# Patient Record
Sex: MALE | Race: WHITE | NOT HISPANIC OR LATINO | Employment: FULL TIME | URBAN - METROPOLITAN AREA
[De-identification: names, ages, dates, MRNs, and addresses within clinical notes are randomized per-mention and may not be internally consistent; named-entity substitution may affect disease eponyms.]

---

## 2024-04-24 ENCOUNTER — TELEPHONE (OUTPATIENT)
Dept: BEHAVIORAL HEALTH | Facility: CLINIC | Age: 24
End: 2024-04-24

## 2024-04-24 ENCOUNTER — HOSPITAL ENCOUNTER (EMERGENCY)
Facility: CLINIC | Age: 24
Discharge: HOME | End: 2024-04-25
Attending: EMERGENCY MEDICINE | Admitting: EMERGENCY MEDICINE

## 2024-04-24 DIAGNOSIS — U07.1 LAB TEST POSITIVE FOR DETECTION OF COVID-19 VIRUS: ICD-10-CM

## 2024-04-24 DIAGNOSIS — R45.851 SUICIDAL IDEATION: ICD-10-CM

## 2024-04-24 PROBLEM — F43.23 ADJUSTMENT DISORDER WITH MIXED ANXIETY AND DEPRESSED MOOD: Status: ACTIVE | Noted: 2024-04-24

## 2024-04-24 LAB
ALBUMIN SERPL BCG-MCNC: 4.7 G/DL (ref 3.5–5.2)
ALP SERPL-CCNC: 71 U/L (ref 40–150)
ALT SERPL W P-5'-P-CCNC: 66 U/L (ref 0–70)
AMPHETAMINES UR QL SCN: ABNORMAL
ANION GAP SERPL CALCULATED.3IONS-SCNC: 17 MMOL/L (ref 7–15)
AST SERPL W P-5'-P-CCNC: 49 U/L (ref 0–45)
BARBITURATES UR QL SCN: ABNORMAL
BASOPHILS # BLD AUTO: 0 10E3/UL (ref 0–0.2)
BASOPHILS NFR BLD AUTO: 0 %
BENZODIAZ UR QL SCN: ABNORMAL
BILIRUB SERPL-MCNC: 1.7 MG/DL
BUN SERPL-MCNC: 10.3 MG/DL (ref 6–20)
BZE UR QL SCN: ABNORMAL
CALCIUM SERPL-MCNC: 9.6 MG/DL (ref 8.6–10)
CANNABINOIDS UR QL SCN: ABNORMAL
CHLORIDE SERPL-SCNC: 99 MMOL/L (ref 98–107)
CREAT SERPL-MCNC: 0.75 MG/DL (ref 0.67–1.17)
DEPRECATED HCO3 PLAS-SCNC: 20 MMOL/L (ref 22–29)
EGFRCR SERPLBLD CKD-EPI 2021: >90 ML/MIN/1.73M2
EOSINOPHIL # BLD AUTO: 0 10E3/UL (ref 0–0.7)
EOSINOPHIL NFR BLD AUTO: 0 %
ERYTHROCYTE [DISTWIDTH] IN BLOOD BY AUTOMATED COUNT: 12.3 % (ref 10–15)
FENTANYL UR QL: ABNORMAL
GLUCOSE BLDC GLUCOMTR-MCNC: 93 MG/DL (ref 70–99)
GLUCOSE SERPL-MCNC: 67 MG/DL (ref 70–99)
HCT VFR BLD AUTO: 44.8 % (ref 40–53)
HGB BLD-MCNC: 15.9 G/DL (ref 13.3–17.7)
IMM GRANULOCYTES # BLD: 0 10E3/UL
IMM GRANULOCYTES NFR BLD: 0 %
LYMPHOCYTES # BLD AUTO: 1.1 10E3/UL (ref 0.8–5.3)
LYMPHOCYTES NFR BLD AUTO: 11 %
MCH RBC QN AUTO: 31.9 PG (ref 26.5–33)
MCHC RBC AUTO-ENTMCNC: 35.5 G/DL (ref 31.5–36.5)
MCV RBC AUTO: 90 FL (ref 78–100)
MONOCYTES # BLD AUTO: 0.4 10E3/UL (ref 0–1.3)
MONOCYTES NFR BLD AUTO: 4 %
NEUTROPHILS # BLD AUTO: 8.6 10E3/UL (ref 1.6–8.3)
NEUTROPHILS NFR BLD AUTO: 85 %
NRBC # BLD AUTO: 0 10E3/UL
NRBC BLD AUTO-RTO: 0 /100
OPIATES UR QL SCN: ABNORMAL
PCP QUAL URINE (ROCHE): ABNORMAL
PLATELET # BLD AUTO: 214 10E3/UL (ref 150–450)
POTASSIUM SERPL-SCNC: 4.3 MMOL/L (ref 3.4–5.3)
PROT SERPL-MCNC: 7.5 G/DL (ref 6.4–8.3)
RBC # BLD AUTO: 4.98 10E6/UL (ref 4.4–5.9)
SARS-COV-2 RNA RESP QL NAA+PROBE: POSITIVE
SODIUM SERPL-SCNC: 136 MMOL/L (ref 135–145)
WBC # BLD AUTO: 10.1 10E3/UL (ref 4–11)

## 2024-04-24 PROCEDURE — 82374 ASSAY BLOOD CARBON DIOXIDE: CPT | Performed by: EMERGENCY MEDICINE

## 2024-04-24 PROCEDURE — 82247 BILIRUBIN TOTAL: CPT | Performed by: EMERGENCY MEDICINE

## 2024-04-24 PROCEDURE — 99285 EMERGENCY DEPT VISIT HI MDM: CPT

## 2024-04-24 PROCEDURE — 250N000013 HC RX MED GY IP 250 OP 250 PS 637: Performed by: EMERGENCY MEDICINE

## 2024-04-24 PROCEDURE — 80307 DRUG TEST PRSMV CHEM ANLYZR: CPT | Performed by: EMERGENCY MEDICINE

## 2024-04-24 PROCEDURE — 250N000009 HC RX 250: Performed by: EMERGENCY MEDICINE

## 2024-04-24 PROCEDURE — 51798 US URINE CAPACITY MEASURE: CPT

## 2024-04-24 PROCEDURE — 87635 SARS-COV-2 COVID-19 AMP PRB: CPT | Performed by: EMERGENCY MEDICINE

## 2024-04-24 PROCEDURE — 36415 COLL VENOUS BLD VENIPUNCTURE: CPT | Performed by: EMERGENCY MEDICINE

## 2024-04-24 PROCEDURE — 85025 COMPLETE CBC W/AUTO DIFF WBC: CPT | Performed by: EMERGENCY MEDICINE

## 2024-04-24 PROCEDURE — 82962 GLUCOSE BLOOD TEST: CPT

## 2024-04-24 RX ORDER — LIDOCAINE HYDROCHLORIDE 20 MG/ML
6 JELLY TOPICAL ONCE
Status: COMPLETED | OUTPATIENT
Start: 2024-04-24 | End: 2024-04-24

## 2024-04-24 RX ORDER — OLANZAPINE 5 MG/1
10 TABLET, ORALLY DISINTEGRATING ORAL 2 TIMES DAILY PRN
Status: DISCONTINUED | OUTPATIENT
Start: 2024-04-24 | End: 2024-04-25 | Stop reason: HOSPADM

## 2024-04-24 RX ORDER — LORAZEPAM 1 MG/1
1 TABLET ORAL ONCE
Status: COMPLETED | OUTPATIENT
Start: 2024-04-24 | End: 2024-04-24

## 2024-04-24 RX ORDER — OXYMETAZOLINE HCL 0.05 %
2 SPRAY, NON-AEROSOL (ML) NASAL AT BEDTIME
COMMUNITY

## 2024-04-24 RX ADMIN — LIDOCAINE HYDROCHLORIDE 6 ML: 20 JELLY TOPICAL at 13:40

## 2024-04-24 RX ADMIN — NICOTINE POLACRILEX 2 MG: 2 GUM, CHEWING ORAL at 05:05

## 2024-04-24 RX ADMIN — LORAZEPAM 1 MG: 1 TABLET ORAL at 05:08

## 2024-04-24 RX ADMIN — OLANZAPINE 10 MG: 5 TABLET, ORALLY DISINTEGRATING ORAL at 21:54

## 2024-04-24 RX ADMIN — NICOTINE POLACRILEX 2 MG: 2 GUM, CHEWING ORAL at 14:41

## 2024-04-24 RX ADMIN — NICOTINE POLACRILEX 2 MG: 2 GUM, CHEWING ORAL at 21:17

## 2024-04-24 RX ADMIN — NICOTINE POLACRILEX 2 MG: 2 GUM, CHEWING ORAL at 09:58

## 2024-04-24 ASSESSMENT — ACTIVITIES OF DAILY LIVING (ADL)
ADLS_ACUITY_SCORE: 35

## 2024-04-24 NOTE — ED TRIAGE NOTES
BIBA for SI. Patient went through a break up this evening. Patients mother called PD. Patient was found in [place] and was very distraught when talking to PD threatening to shoot himself or drive off a bridge. Patient was found with vehicle and without a weapon.Patient brought in on a transport hold. VSS.

## 2024-04-24 NOTE — PHARMACY-ADMISSION MEDICATION HISTORY
Pharmacist Admission Medication History    Admission medication history is complete. The information provided in this note is only as accurate as the sources available at the time of the update.    Information Source(s): Patient and CareEverywhere/SureScripts via in-person    Pertinent Information: none    Changes made to PTA medication list:  Added: Zzzquil  Deleted: None  Changed: None    Medication History Completed By:   Yari Garcia PharmD, [facility]   Emergency Medicine Pharmacist  [phone] or Elliot  April 24, 2024    PTA Med List   Medication Sig Last Dose    diphenhydrAMINE HCl, Sleep, (ZZZQUIL) 25 MG CAPS Take 2 capsules by mouth at bedtime 4/23/2024 at hs

## 2024-04-24 NOTE — TELEPHONE ENCOUNTER
2:14 PM: Intake called Infection Prevention to have Pt reviewed after Pt tested positive Covid. Voicemail was left.    3:22 PM: Infection Prevention reviewed Pt's chart. Pt renata be flagged for Covid and will need a private room. Pt will be formed of Covid guidelines upon placement.

## 2024-04-24 NOTE — TELEPHONE ENCOUNTER
R: [place]  Access Inpatient Bed Call Log 4/24/24 7:26 AM    Intake has called facilities that have not updated the bed status within the last 12 hours.                   Adults:         [facility] is posting 0 beds.         [facility] is posting 0 beds. [phone]   [facility] posted 0 beds. [facility] [phone] Negative covid required.     [facility] is posting 0 beds. Neg covid. No high school/[facility]-psych. [phone]; Per call at 7:28am to [facility] currently at capacity; unsure of any discharges can check back later.   [facility] posted 0 beds.  [phone] [facility] posted 0 beds. [phone]    [facility] posted 0 beds.  [facility] [phone]   [facility] is posting 0 YA beds. Ages 18-28. Negative covid. [phone]; Per call at 7:49am beds are available for YA, child and adolescent reviews.   [facility] is posting 1 bed. [phone]        Pt remains on the work list pending appropriate bed availability.

## 2024-04-24 NOTE — TELEPHONE ENCOUNTER
S: [facility] [place] ED , DEC  Per  calling at 10:07 AM  about a 23 year old/Male presenting with SI w/ a plan.     B: Pt arrived via Police. Presenting problem, stressors: Police. Mom called w/ concerns of comments made about suicide. Pt has plan to drive into a body of water or to shoot himself in the head. Yesterday his girlfriend broke up with him.    Pt affect in ED: Flat and Irritable   Pt Dx: Major Depressive Disorder, Generalized Anxiety Disorder, and Adjustment Disorder  Previous IPMH hx? No  Pt endorses SI with a plan to shoot himself or drive into a body of water.    Hx of suicide attempt? Yes: 2022 by overdose, no med intervention and was not reported at the time.  Pt endorses SIB via head bang, most recent episode unknown.  Pt denies HI   Pt denies hallucinations .   Pt RARS Score: 2    Hx of aggression/violence, sexual offenses, legal concerns, Epic care plan? describe: No  Current concerns for aggression this visit? No  Does pt have a history of Civil Commitment? No  Is Pt their own guardian? Yes    Pt is not prescribed medication. Is patient medication compliant? N/A  Pt denies OP services   CD concerns: Actively using/consuming marijuana daily and drinks beer sometimes to fall asleep  Acute or chronic medical concerns: No  Does Pt present with specific needs, assistive devices, or exclusionary criteria? None      Pt is ambulatory  Pt is able to perform ADLs independently      A: Pt to be reviewed for [facility] admission. Pt is Voluntary  Preferred placement: [facility]1    COVID Symptoms: Tested positive for Covid 4/24/24.  If yes, COVID test required   Utox: Not ordered, intake to request lab    CMP: Not ordered, intake requested lab  CBC: Not ordered, intake requested lab      R: Patient cleared and ready for behavioral bed placement: Yes  Pt placed on [facility] worklist? Yes    Does Patient need a Transfer Center request created? Yes, writer completed Transfer Center request at: 10:13 AM.

## 2024-04-24 NOTE — ED PROVIDER NOTES
"  History     Chief Complaint:  Suicidal       The history is provided by the patient.      Ifeanyi Cooley is a 23 year old male with history of anxiety who presents to the ED via EMS alone for evaluation of suicidal ideation. Patient reports \"there is a lot going on.\" He has been dealing with chronic medical problems and trying to pay rent. Family is not a reliable support system. He has had ongoing issues with his girlfriend who broke up with him this evening. States she was the only one he could turn to. Ifeanyi was very hurt by the lack of support from his family tonight. His mother reportedly caused anxiety and frustration and then called the police. He made suicidal comments upon Police arrival. 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 Denies thoughts of self harm. He doesn't want to feel the pain and frustration anymore. Reports notable family history of mental illness. He notes calling the suicide hotline. He also called his friends who have been supportive in the past. Dg with nicotine.      Independent Historian:   None - Patient Only      Medications:    Ativan    Past Medical History:    Anxiety       Physical Exam   Patient Vitals for the past 24 hrs:   BP Pulse Resp SpO2   04/24/24 0438 106/81 89 18 100 %        Physical Exam  General: Patient is alert, awake and interactive when I enter the room  Head: The scalp, face, and head appear normal  Eyes: Conjunctivae are normal  ENT: The nose is normal, Pinnae are normal, External acoustic canals are normal  Neck: Trachea midline  CV: Pulses are normal.   Resp: No respiratory distress   Musc: Normal muscular tone, moving all extremities.  Skin: No rash or lesions noted  Neuro:  Speech is normal and fluent. Face is symmetric.   Psych: Tearful.  Poor eye contact.  Denies any active suicidal or homicidal ideation.  Denies any auditory or visual hallucinations.    Emergency Department Course     Emergency Department Course & " Assessments:    Interventions:  Medications   nicotine (NICORETTE) gum 2 mg (2 mg Buccal $Given 4/24/24 0505)   LORazepam (ATIVAN) tablet 1 mg (1 mg Oral $Given 4/24/24 0508)        Assessments:  0437 I obtained patient history and performed a physical exam.     Consultations/Discussion of Management or Tests:  ED Course as of 04/24/24 0718   Wed Apr 24, 2024   0437 I obtained patient history and performed a physical exam.        Social Determinants of Health affecting care:   None and Financial Insecurity    Disposition:  Signed out pending mental health assessment    Impression & Plan         Medical Decision Making:  Patient is a 23-year-old gentleman who presents to the emergency department after making some suicidal statements to his mother.  Patient's been under a significant amount of stress both financial and emotional.  After recent break-up with his girlfriend and the lack of family support he felt like he had nowhere to turn and made some suicidal comments.  In the emergency department he actively denies any intent to carry out these actions.  He denies any actions of self-harm this evening.  Patient was placed on a hold and is currently awaiting mental health assessment.  Final disposition will be based on this assessment.      Diagnosis:    ICD-10-CM    1. Suicidal ideation  R45.851              Scribe Disclosure:  I, Sharron Francois, am serving as a scribe at 4:51 AM on 4/24/2024 to document services personally performed by Thony Contreras MD based on my observations and the provider's statements to me.   4/24/2024   Thony Contreras MD Battista, Christopher Joseph, MD  04/24/24 9495

## 2024-04-24 NOTE — CONSULTS
"Triage & Transition Services, Extended Care       Patient: Ifeanyi goes by \"Ifeanyi,\" uses he/him pronouns  Date of Service: April 24, 2024  Site of Service: [facility] EMERGENCY DEPT                             ED15  Patient was seen   Virtual: [facility]  Mode of Assessment:      Patient is followed related to: other  Notable observations from today's encounter include: NA  The care team is working towards the following:    Significant status changes: no  Case Management included: Case Management Included: collaborating with patient's support system  Details on Collaborating with Patient's Support System: Notified Pt's care team that the MEGAN consult has been completed.  Summary of Interaction: Writer met with pt to complete MEGAN consult. Pt denied his use was problematic and reported he would have no problem quitting on his own. Pt reported he is not feeling very down and axious. He reported needing support and was upset that his family is unsupportive. Pt denied any SI.    Recommendations: If you are struggling with alcohol or substance abuse, please reach out to one of the following treatment centers to get an outpatient MEGAN assessment.     [facility] - Walk in Assessments -8am-4pm M-F  [address] [place] [address]  [phone]    [facility] Walk in Assessments -8am-4pm M-F  [address] [id number] [phone]    [facility] - Assessments by appointment.  [address] [place] [address]  [phone]    Sober Support Groups:     [place] [facility] ([facility]): [facility] connects people seeking recovery to resources that help foster and sustain long-term recovery. Whether you are seeking resources for treatment, transportation, housing, job training, education, health care or other pathways to recovery, [facility] is a great place to start. Phone: 527.136.1938. www.[place]Envysion.iloho (Great listing of all types of recovery and " non-recovery related resources)     SMART Recovery: https://www.smartrecovery.org/ (Online meetings, support groups, resources)     Alcoholics Anonymous: 1-800-ALCOHOL  HTTP://WWW.AA.ORG/  [facility] [place] (729-056-8609 or http://a[place].org)  [facility] [place] (860-221-9887 or www.aastpaul.org)     Narcotics Anonymous: 850.208.6444  www.DDStocks..     The [facility]:  Offers a wide range of different sober support groups and a Sunday Service.  [address] [place] [address]    https://www.[facility].org/#gsc.tab=0        Summary: Writer met with pt to complete MEGAN consult. Pt denied his use was problematic and reported he would have no problem quitting on his own. Pt reported he is not feeling very down and axious. He reported needing support and was upset that his family is unsupportive. Pt denied any SI.        Legal Status:    Legal Status at Admission: Voluntary/Patient has signed consent for treatment    [facility]   Extended Care  [phone]

## 2024-04-24 NOTE — ED NOTES
Pt reports need catheter, and is uncomfortable.  Bedside attendant in room.  ED tech aware need straight cath that provider ordered.

## 2024-04-24 NOTE — ED NOTES
Pt asked writer to put catheter in him d/t problems urinating. Writer informed pt that we have certain protocols to follow before placing a catheter. Bladder scan performed on pt. Results show 350 mL. Antoine RN informed.

## 2024-04-24 NOTE — PLAN OF CARE
Ifeanyi Cooley  April 24, 2024  Plan of Care Hand-off Note     Patient Care Path: inpatient mental health    Plan for Care:   Lexa presents to the ED after 911 was called by pt mother reporting concerns for suicidal comments patient made. Patient was found sitting in his car. Patient reported to PD methods of suicide to shoot himself or to drive into water. Patient now reports he is homeless and has no support. Patient does not endorse any plans or intent for suicide to writer, however given his recent interrupted suicide attempt early this morning and stressful psychosocial circumstances, patient is recommended to admit to [facility] unit for safety and stabilization. Patient would be an acute risk of harm to himself should he discharge at this time.    Identified Goals and Safety Issues:  Suicidal ideation, suicide precautions in place recommended. Patient could benefit from MEGAN assessment - writer will place order.     Overview:     Patient does not want care team to provide updates to his momJanie.           Legal Status: Legal Status at Admission: Voluntary/Patient has signed consent for treatment    Psychiatry Consult: No       Updated  MD, RN, Pt regarding plan of care.           Per Turner
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
## 2024-04-24 NOTE — ED PROVIDER NOTES
Patient signed out to me by Dr. Contreras.  Please see initial provider note for full details. In brief, patient presents with SI.    9:30 AM - I spoke with DEC (Per) regarding the patient.  Per DEC, collateral SI statements over the last few days with plan/methods.  Recommends IP mental health admission.      DEC requests labs, urine tox, and COVID testing for IP psych admission.     Labs notable for mild hyperglycemia and mild anion gap acidosis.  Suspect from poor oral intake - no ASA use or ETOH use.  No signs of infection.  Regular diet ordered.  Patient is positive for COVID-19 on testing but denies any symptoms of COVID-19. He is vitally stable. Lungs are clear, no concern for pneumonia.  He is medically cleared for psych admission.     Russell Diego MD  Emergency Medicine     Brandie, Russell Strickland MD  04/24/24 1341       Russell Diego MD  04/24/24 1341

## 2024-04-24 NOTE — ED NOTES
Patient searched by security and changed into gown. 1 bag of belongings placed into the DEC office. Patient also verbally denied sharing information with mother.

## 2024-04-25 ENCOUNTER — TELEPHONE (OUTPATIENT)
Dept: BEHAVIORAL HEALTH | Facility: CLINIC | Age: 24
End: 2024-04-25

## 2024-04-25 VITALS
OXYGEN SATURATION: 99 % | RESPIRATION RATE: 18 BRPM | TEMPERATURE: 97.6 F | DIASTOLIC BLOOD PRESSURE: 79 MMHG | HEART RATE: 80 BPM | SYSTOLIC BLOOD PRESSURE: 112 MMHG

## 2024-04-25 PROCEDURE — 250N000013 HC RX MED GY IP 250 OP 250 PS 637: Performed by: EMERGENCY MEDICINE

## 2024-04-25 PROCEDURE — 250N000011 HC RX IP 250 OP 636: Performed by: EMERGENCY MEDICINE

## 2024-04-25 RX ORDER — ONDANSETRON 4 MG/1
4 TABLET, ORALLY DISINTEGRATING ORAL ONCE
Status: COMPLETED | OUTPATIENT
Start: 2024-04-25 | End: 2024-04-25

## 2024-04-25 RX ADMIN — NICOTINE POLACRILEX 2 MG: 2 GUM, CHEWING ORAL at 13:29

## 2024-04-25 RX ADMIN — ONDANSETRON 4 MG: 4 TABLET, ORALLY DISINTEGRATING ORAL at 06:59

## 2024-04-25 ASSESSMENT — COLUMBIA-SUICIDE SEVERITY RATING SCALE - C-SSRS
1. SINCE LAST CONTACT, HAVE YOU WISHED YOU WERE DEAD OR WISHED YOU COULD GO TO SLEEP AND NOT WAKE UP?: NO
ATTEMPT SINCE LAST CONTACT: NO
2. HAVE YOU ACTUALLY HAD ANY THOUGHTS OF KILLING YOURSELF?: NO
LETHALITY/MEDICAL DAMAGE CODE MOST LETHAL ACTUAL ATTEMPT: MINOR PHYSICAL DAMAGE
MOST LETHAL DATE: 66110
TOTAL  NUMBER OF ABORTED OR SELF INTERRUPTED ATTEMPTS SINCE LAST CONTACT: NO
TOTAL  NUMBER OF INTERRUPTED ATTEMPTS SINCE LAST CONTACT: NO
6. HAVE YOU EVER DONE ANYTHING, STARTED TO DO ANYTHING, OR PREPARED TO DO ANYTHING TO END YOUR LIFE?: NO

## 2024-04-25 ASSESSMENT — ACTIVITIES OF DAILY LIVING (ADL)
ADLS_ACUITY_SCORE: 35

## 2024-04-25 NOTE — ED NOTES
"Writer had long conversation with patient. Pt is feeling anxious and scared about where he is going to be placed. He endorses SI but says \"I don't have the courage to do it\" and \"I'm not gonna kill myself\". Pt notes he was looking into [facility] units prior to PD arrival and he would like to go someplace like that. Pt told writer that he just feels like he needs a support group. Endorses feeling stressed about missing work, money, and lack of support in his life.  "

## 2024-04-25 NOTE — ED NOTES
Pt straight catheterized, 200ml urine returned, PVR also 200ml. Pt tearful following bladder scan, explaining that he's scared and he doesn't want to have to be catheterized every day. Pt told writer that he urinates fine at home, he is anxious here and wants more privacy. Pt also requesting a shower; RN told pt he will likely be able to get one in the morning.

## 2024-04-25 NOTE — ED NOTES
"Patient woke up from a nap extremely anxious and wanting to know the plan. Attempted to answer patients  questions and medication for anxiety but patient unhappy that he is waiting for inpatient placement and stated \"they didn't listen to anything he said to DEC\" Patient then refused medications. States he doesn't know how he can stay here if he can't pee. MD updated.   "

## 2024-04-25 NOTE — TELEPHONE ENCOUNTER
R: [place]  Access Inpatient Bed Call Log 4/24/24 @9:40 PM:    Intake has called facilities that have not updated the bed status within the last 12 hours.                   Adults:         [facility] is posting 0 beds.         [facility] is posting 0 beds. [phone]   [facility] posted 0 beds. [facility] [phone] Negative covid required.     [facility] is posting 0 beds. Neg covid. No high school/[facility]-psych. [phone]; Per call at 7:28am to [facility] currently at capacity; unsure of any discharges can check back later.   [facility] posted 0 beds.  [phone] [facility] posted 0 beds. [phone]    [facility] posted 0 beds.  [facility] [phone]   [facility] is posting 2 YA beds. Ages 18-28. Negative covid. [phone]; Per call at 7:49am beds are available for YA, child and adolescent reviews.   [facility] is posting 0 bed. [phone]        [facility] is posting 3 beds. LOW acuity ONLY. Mixed unit 12+. Negative covid- [phone].  Per call, not appropriate.    [facility] has 3 beds posted. No aggression. Negative Covid. Low acuity.  At capacity       [facility] is posting 0 beds. Negative covid. [phone]   [facility] is posting 0 beds. Low acuity. No current aggression.           Pt remains on the work list pending appropriate bed availability.

## 2024-04-25 NOTE — ED NOTES
Pt attempted to urinate without success. Bladder scanned pt. 474 mls in bladder. Pt agreed to catheterization after meds.

## 2024-04-25 NOTE — ED NOTES
Spoke with patient's sister Jodi. Patient prefers any care and information about him be relayed to Jodi.

## 2024-04-25 NOTE — ED NOTES
Pt awake, asked pt if he could try to pee again & educated on the importance of trying to empty his bladder. Pt stated that he would try in a little bit but did not want to do that now.

## 2024-04-25 NOTE — ED NOTES
RN ED Mental Health Handoff Note    Voluntary    Does patient require 1:1? Yes    Hold and rights been given and documented for patient: Yes    Is the patient in BH scrubs? No -Gown    Has the patient been searched? Yes    Is the 15 minute observation tool up to date? Yes    Was patient issued a welcome folder? Yes    Room check completed this shift: Yes    PSS3 and [place] Assessment/Reassessment this shift:    C-SSRS ([place])      Date and Time Q1 Wished to be Dead (Past Month) Q2 Suicidal Thoughts (Past Month) Q3 Suicidal Thought Method Q4 Suicidal Intent without Specific Plan Q5 Suicide Intent with Specific Plan Q6 Suicide Behavior (Lifetime) Within the Past 3 Months? Level of Risk per Screen Level of Risk per Screen User   04/24/24 0441 1-->yes 1-->yes 1-->yes -- 1-->yes 1-->yes 1-->yes -- high risk SA            Behavioral status of patient: Green    Code 21 called this shift? No    Use of restraints/seclusion this shift? No    Most recent vital signs:  Temp: 98.3  F (36.8  C) Temp src: Temporal BP: 106/73 Pulse: 84   Resp: 16 SpO2: 100 %        Medications:  Scheduled medication compliance? Yes    PRN Meds administered this shift? No    Medications   nicotine (NICORETTE) gum 2 mg (2 mg Buccal $Given 4/24/24 2117)   OLANZapine zydis (zyPREXA) ODT tab 10 mg (10 mg Oral $Given 4/24/24 2154)   LORazepam (ATIVAN) tablet 1 mg (1 mg Oral $Given 4/24/24 0508)   lidocaine (XYLOCAINE) 2 % external gel 6 mL (6 mLs Urethral $Given 4/24/24 1340)         ADLs    Meal Provided this shift? No    Hygiene items provided? Yes    ADLs completed? No    Date of last shower: PTA- wants to shower in AM    Any significant events this shift? No    Any information that would be helpful in caring for this patient?  Pt has trouble w/ urination here, no problems prior. Straight cath x2, able to void x1 w/ small amount of residual. Pt anxious about plan.     Family present/updated? No    Location of patient's belongings: DEC office.      Critical Care Minutes:  Does the patient need critical care minutes documented? No                \

## 2024-04-25 NOTE — PROGRESS NOTES
"Triage and Transition Services Extended Care Reassessment     Patient: Ifeanyi goes by \"Lexa,\" uses he/him pronouns  Date of Service: April 25, 2024  Site of Service: [facility] EMERGENCY DEPT                             ED05  Patient was seen yes  Mode of Assessment: Virtual: AmWell     Reason for Reassessment: worsening psychosocial stress, depression, suicidal ideation    History of Patient's Original Emergency Room Encounter: Ifeanyi \"Lexa\" comes to the ED by police after his mom called police for concerns that patient was a risk of harm to himself. Police found the patient sitting in his car and patient reported plans for suicide to police. Patient had plans to drive off a bridge or shoot himself, per notes. Patient does not detail plans for suicide to writer, and altogether does not endorse having plans for suicide, or intent to end his life. Patient reports having a \"rough patch\" with his girlfriend in recent weeks and that she broke up with him yesterday,pt states he suspected it was coming. Now, patient states he is homeless and has no one for support in his life. He felt betrayed by his mom for calling police yesterday, and states \"I just wanted to feel supported by my parents, I have never felt like I can depend on them\".    Presentation Summary: Patient is alert and oriented x4. Patient was calm and cooperative. Patient engaged fully in the assessment process. Patient is able to concisely articulate precipitating events to this emergency department encounter. Patient identifies stressors as his girlfriend breaking up with him earlier this week, as well as conflict with his mother. Patient reports he was \"so overwhelmed\" prior to arrival that he \"said a whole bunch of things\". Patient admits he \"scared people close to me\" in order to get \"validation\". Patient reports he was feeling alone and shut out, and wanted to feel heard. Patient reports wanting help to feel better. Patient identifies " "support system with his sister, grandma, and boss. Patient identifies friends Rodolfo, Kj, and Irene. Patient denies any current suicidal ideation, plan, or intent. Patient was asked by this writer about initial reports of plan to use firearm. Patient denies making any such comments and similarly denies access to any firearms. Patient denies having a firearm permit or money to purchase a firearm. Patient reports he \"could never do that\". Patient reports he would not want to leave loved ones behind. Patient denies any SIB, HI, AH or VH. Patient endorses substance use including nicotine delta 8 but denies any concern with this. Patient is future oriented and reports being in the process for filing for state insurance as well as trying to get one of his teeth pulled soon. Patient reports wanting to discharge from the hospital, cites being uncomfortable here due to having to be catheterized in order to relieve his bladder. Patient reports urine retention happens when he is under increased anxiety and settings he is unfamiliar with. Patient is help seeking and future oriented. Patient reports he will discharge and stay with his sister.    Changes Observed Since Initial Assessment: decrease in presenting symptoms    Therapeutic Interventions Provided: Engaged in safety planning, Worked on relapse prevention planning (review of stressors, early warning signs, written plan to respond to signs, and rehearse plan).    Current Symptoms: anxious sadness anxious        Mental Status Exam   Affect: Appropriate  Appearance: Appropriate  Attention Span/Concentration: Attentive  Eye Contact: Engaged    Fund of Knowledge: Appropriate   Language /Speech Content: Fluent  Language /Speech Volume: Normal  Language /Speech Rate/Productions: Normal  Recent Memory: Intact  Remote Memory: Intact  Mood: Depressed, Anxious  Orientation to Person: Yes   Orientation to Place: Yes  Orientation to Time of Day: Yes  Orientation to Date: Yes   " "  Situation (Do they understand why they are here?): Yes  Psychomotor Behavior: Normal  Thought Content: Clear  Thought Form: Intact    Treatment Objective(s) Addressed: assessing safety, safety planning, identifying an appropriate aftercare plan    Patient Response to Interventions: eager to participate, verbalizes understanding    Progress Towards Goals:  Patient Reports Symptoms Are: improving  Patient Progress Toward Goals: is making progress  Next Step to Work Toward Discharge: collaboration with OP team/family/friends  Collaboration Comment:  spoke with patient's sister Jodi, with patient's permission.  Please see details in case management section below.    Case Management: Case Management Included: collaborating with patient's support system  Details on Collaborating with Patient's Support System: Sister Jodi Adair [phone]  Summary of Interaction: Patient's sister reports their mother has TBI, borderline personality disorder and alcohol abuse. Patient's mother lost custody of patient and his siblings at age 4. Patient's father also used substances, including methamphetamine. Patient was in out of home placement during his childhood. Patient's sister reports mother's ongoing mental decompensation is impacting patient currently. Patient's mother is currently \"on a granado\". Patient subsequently left home yesterday. Patient called his sister to express feeling like he is struggling and lacks support. Patient's mother is \"quick to contact police to be vindictive\" and \"calls the police on him all the time\". Patient's sister does not think being in the hospital alone with COVID is \"not helpful for his mental state\". Patient's sister denies concern for patient suicidality. Patient's sister reports patient is calm and was just \"crying that he needed support from their mom\".    C-SSRS Since Last Contact:   1. Wish to be Dead (Since Last Contact): No  2. Non-Specific Active Suicidal Thoughts " "(Since Last Contact): No     Actual Attempt (Since Last Contact): No  Has subject engaged in non-suicidal self-injurious behavior? (Since Last Contact): No  Interrupted Attempts (Since Last Contact): No  Aborted or Self-Interrupted Attempt (Since Last Contact): No  Preparatory Acts or Behavior (Since Last Contact): No  Most Lethal Attempt Date: 01/01/22  Actual Lethality/Medical Damage Code (Most Lethal Attempt): Minor physical damage  Calculated C-SSRS Risk Score (Since Last Contact): No Risk Indicated    Plan: Final Disposition / Recommended Care Path: discharge  Plan for Care reviewed with assigned Medical Provider: yes  Plan for Care Team Review: provider  Comments: Dr. Huffman  Patient and/or validated legal guardian concurs: yes  Clinical Substantiation: Patient is alert and oriented x4. Patient was calm and cooperative. Patient engaged fully in the assessment process. Patient is able to concisely articulate precipitating events to this emergency department encounter. Patient identifies stressors as his girlfriend breaking up with him earlier this week, as well as conflict with his mother. Patient reports he was \"so overwhelmed\" prior to arrival that he \"said a whole bunch of things\". Patient has denied any suicidal ideation since his arrival.  Patient continues to deny any concern for SIB, SI, HI, AH or VH.  Patient is requesting discharge.   spoke with sister who is in agreement of patient's return back.  Patient is future oriented with plans to apply for government insurance.  Patient will have [facility] follow-up for outpatient services to begin once his insurance is approved.    Legal Status: Legal Status at Admission: Voluntary/Patient has signed consent for treatment    Session Status: Time session started: 1500  Time session ended: 1530  Session Duration (minutes): 30 minutes  Session Number: 1  Anticipated number of sessions or this episode of care: 1    Session Start Time: 1500  Session Stop " Time: 1530  CPT codes: 67637 - Psychotherapy (with patient) - 30 (16-37*) min  Time Spent: 30 minutes      CPT code(s) utilized: 56356 - Psychotherapy (with patient) - 30 (16-37*) min    Diagnosis:   Patient Active Problem List   Diagnosis    Adjustment disorder with mixed anxiety and depressed mood    Alcohol use disorder, mild, in early remission    Cannabis use disorder, moderate, in early remission (H)    ROSHAN (generalized anxiety disorder)    Moderate single current episode of major depressive disorder (H)    Oppositional defiant disorder    Parent/child conflict    S/P appendectomy       Primary Problem This Admission: Active Hospital Problems    Adjustment disorder with mixed anxiety and depressed mood        DANIEL Perkins   Licensed Mental Health Professional (LMHP), [facility]  [phone]

## 2024-04-25 NOTE — ED PROVIDER NOTES
Took over care of the patient at handoff.    Patient has been calm and cooperative during my shift.  No acute events.  I do get a call from DEC was reevaluated the patient.  He is denying any suicidal or homicidal ideation.  DEC has also spoken with the patient's sister.  Patient states that he has other places to stay, he does not want to go back with his mother at this point.  Since his mother is a trigger for him I think this is a good idea.  Patient will either stay with a friend or with his sister.  At this point I do not believe that the patient is a harm to themselves or others.     Patient will discharge home.     Kierra Huffman MD  04/27/24 0716

## 2024-04-25 NOTE — TELEPHONE ENCOUNTER
R:  No bed availability within [facility]    Pt did test positive for COVID yesterday  4/24/24.  Pt agreeable to [facility] +1hr bed search.    Bed search initiated @ 10:40am       [facility] is posting 0 beds. [phone] Per call at 7:23am to [facility] currently no beds available.   [facility] posted 0 beds. [facility] [phone] Negative covid required.     [facility] is posting 0 beds. Neg covid. No high school/[facility]-psych. [phone]; Per call at 7:24am to [facility] currently at capacity.   [place] posted 0 beds.  [phone] [facility] posted 0 beds. [phone]    [facility] posted 0 beds.  [facility] [phone]   [facility] is posting 1 YA beds. Ages 18-28. Negative covid. [phone]; Per call at 7:25am to [facility], child, adolescent beds are available, however, no jovanny beds.   [facility] ([facility]) is posting 0 bed. [phone]        [facility] is posting 3 beds. LOW acuity ONLY. Mixed unit 12+. Negative covid- [phone]  Will not review for 6 days after a Positvie COVID Test.   [facility] has 0 beds posted. No aggression. Negative Covid. Low acuity       [facility] is posting 0 beds. Negative covid. [phone] Per call at 7:26am to [facility] at capacity for IP units.   [facility] is posting 1 beds. Low acuity only. Neg covid.  [phone]  [facility] will not review until 9 days clear of COVID.    [facility] ([place] posted 0 beds. Negative covid.  [phone].    [facility] ([place] is posting 0 beds    Pt remains on the work list pending appropriate bed availability.

## 2024-04-25 NOTE — TELEPHONE ENCOUNTER
R: [place]  Access Inpatient Bed Call Log  4/25/2024 12:03 AM  Intake has called facilities that have not updated their bed status within the last 12 hours.??      ADULTS:     *[place]RO  [place] -- [facility]: @ cap per website.  [place] -- [facility]:  @ cap per website.   [place] -- [facility]: @ Cap per website.  [place] -- [facility]: @ cap per website. - 12:34 AM Per [place], they are capped. Call in the morning.   [place] -- [facility]: @ Cap per website.  [facility] -- [facility]: @ cap per website.   [place] -- [facility]/YA beds @Posting 2 beds. Ages 18-28, Voluntary only, COVID test req'd, NO aggression, physical or sexual assault, violence hx or drug abuse, or psychosis - Needs negative covid  [place] -- [facility]: @ Cap per website.  [place] -- [facility]: @ cap per website.  [place] -- [facility]:  @ Cap per website.      Pt remains on waitlist pending appropriate placement availability.

## 2024-04-25 NOTE — ED NOTES
"Patient refusing intermittent straight caths. States \"I'm not doing that. I'll wait till my bladder explodes.\"  "

## 2024-04-25 NOTE — ED NOTES
Patient states information may be given to grandmother Haven but not to his mother at this time. Info in demographics.

## 2024-04-25 NOTE — ED NOTES
RN ED Mental Health Handoff Note    Voluntary - holdable    Does patient require 1:1? Yes    Hold and rights been given and documented for patient: Yes    Is the patient in BH scrubs? No -gown    Has the patient been searched? Yes    Is the 15 minute observation tool up to date? Yes    Was patient issued a welcome folder? Yes    Room check completed this shift: Yes    PSS3 and [place] Assessment/Reassessment this shift:    C-SSRS ([place])      Date and Time Q1 Wished to be Dead (Past Month) Q2 Suicidal Thoughts (Past Month) Q3 Suicidal Thought Method Q4 Suicidal Intent without Specific Plan Q5 Suicide Intent with Specific Plan Q6 Suicide Behavior (Lifetime) Within the Past 3 Months? Level of Risk per Screen Level of Risk per Screen User   04/24/24 0441 1-->yes 1-->yes 1-->yes -- 1-->yes 1-->yes 1-->yes -- high risk SA            Behavioral status of patient: Green    Code 21 called this shift? No    Use of restraints/seclusion this shift? No    Most recent vital signs:  Temp: 98.3  F (36.8  C) Temp src: Temporal BP: 106/73 Pulse: 84   Resp: 16 SpO2: 100 %        Medications:  Scheduled medication compliance? Yes    PRN Meds administered this shift? Yes    Medications   nicotine (NICORETTE) gum 2 mg (2 mg Buccal $Given 4/24/24 2117)   OLANZapine zydis (zyPREXA) ODT tab 10 mg (10 mg Oral $Given 4/24/24 2154)   LORazepam (ATIVAN) tablet 1 mg (1 mg Oral $Given 4/24/24 0508)   lidocaine (XYLOCAINE) 2 % external gel 6 mL (6 mLs Urethral $Given 4/24/24 1340)         ADLs    Meal Provided this shift? Yes    Hygiene items provided? No    ADLs completed? No    Date of last shower: PTA - wants to shower in AM    Any significant events this shift? No    Any information that would be helpful in caring for this patient?  Pt emotional/anxious about plan, also unable to urinate here yet no troubles at home. Straight cathx2 in ED, 200ml residual.    Family present/updated? No    Location of patient's belongings: DEC  office    Critical Care Minutes:  Does the patient need critical care minutes documented? No